# Patient Record
Sex: FEMALE | Race: WHITE
[De-identification: names, ages, dates, MRNs, and addresses within clinical notes are randomized per-mention and may not be internally consistent; named-entity substitution may affect disease eponyms.]

---

## 2018-08-11 ENCOUNTER — HOSPITAL ENCOUNTER (INPATIENT)
Dept: HOSPITAL 31 - C.ER | Age: 38
Discharge: HOME | DRG: 694 | End: 2018-08-11
Attending: INTERNAL MEDICINE | Admitting: INTERNAL MEDICINE
Payer: COMMERCIAL

## 2018-08-11 VITALS
TEMPERATURE: 97.9 F | RESPIRATION RATE: 20 BRPM | OXYGEN SATURATION: 97 % | HEART RATE: 68 BPM | DIASTOLIC BLOOD PRESSURE: 78 MMHG | SYSTOLIC BLOOD PRESSURE: 114 MMHG

## 2018-08-11 DIAGNOSIS — Z87.442: ICD-10-CM

## 2018-08-11 DIAGNOSIS — N13.2: Primary | ICD-10-CM

## 2018-08-11 LAB
ALBUMIN SERPL-MCNC: 4.6 [, G/DL] (ref 3.5–5)
ALBUMIN/GLOB SERPL: 1.5 [,] (ref 1–2.1)
ALT SERPL-CCNC: 23 [, U/L] (ref 9–52)
APTT BLD: 32 [, SECONDS] (ref 21–34)
AST SERPL-CCNC: 22 [, U/L] (ref 14–36)
BASOPHILS # BLD AUTO: 0 [, K/UL] (ref 0–0.2)
BASOPHILS NFR BLD: 0.4 [, %] (ref 0–2)
BILIRUB UR-MCNC: NEGATIVE [,]
BUN SERPL-MCNC: 12 [, MG/DL] (ref 7–17)
CALCIUM SERPL-MCNC: 9.5 [, MG/DL] (ref 8.6–10.4)
EOSINOPHIL # BLD AUTO: 0 [, K/UL] (ref 0–0.7)
EOSINOPHIL NFR BLD: 0.3 [, %] (ref 0–4)
ERYTHROCYTE [DISTWIDTH] IN BLOOD BY AUTOMATED COUNT: 12.3 [, %] (ref 11.5–14.5)
GFR NON-AFRICAN AMERICAN: > 60 [,]
GLUCOSE UR STRIP-MCNC: NORMAL [, MG/DL]
HGB BLD-MCNC: 14.1 [, G/DL] (ref 11–16)
INR PPP: 1.1 [,]
LEUKOCYTE ESTERASE UR-ACNC: (no result) [, LEU/UL]
LIPASE: 33 [, U/L] (ref 23–300)
LYMPHOCYTE: 9 [, %] (ref 20–40)
LYMPHOCYTES # BLD AUTO: 0.9 [, K/UL] (ref 1–4.3)
LYMPHOCYTES NFR BLD AUTO: 8.4 [, %] (ref 20–40)
MCH RBC QN AUTO: 30.7 [, PG] (ref 27–31)
MCHC RBC AUTO-ENTMCNC: 35.3 [, G/DL] (ref 33–37)
MCV RBC AUTO: 87.1 [, FL] (ref 81–99)
MONOCYTE: 1 [, %] (ref 0–10)
MONOCYTES # BLD: 0.3 [, K/UL] (ref 0–0.8)
MONOCYTES NFR BLD: 3 [, %] (ref 0–10)
NEUTROPHILS # BLD: 8.9 [, K/UL] (ref 1.8–7)
NEUTROPHILS NFR BLD AUTO: 87.9 [, %] (ref 50–75)
NEUTROPHILS NFR BLD AUTO: 90 [, %] (ref 50–75)
NRBC BLD AUTO-RTO: 0.1 [, %] (ref 0–2)
PH UR STRIP: 6 [,] (ref 5–8)
PLATELET # BLD EST: NORMAL [,]
PLATELET # BLD: 271 [, K/UL] (ref 130–400)
PMV BLD AUTO: 7.6 [, FL] (ref 7.2–11.7)
PROT UR STRIP-MCNC: (no result) [, MG/DL]
PROTHROMBIN TIME: 12.1 [, SECONDS] (ref 9.7–12.2)
RBC # BLD AUTO: 4.58 [, MIL/UL] (ref 3.8–5.2)
RBC # UR STRIP: (no result) [,]
SP GR UR STRIP: 1.02 [,] (ref 1–1.03)
TOTAL CELLS COUNTED BLD: 100 [,]
UROBILINOGEN UR-MCNC: NORMAL [, MG/DL] (ref 0.2–1)
WBC # BLD AUTO: 10.1 [, K/UL] (ref 4.8–10.8)

## 2018-08-11 PROCEDURE — BT1FZZZ FLUOROSCOPY OF LEFT KIDNEY, URETER AND BLADDER: ICD-10-PCS | Performed by: UROLOGY

## 2018-08-11 PROCEDURE — 0T778DZ DILATION OF LEFT URETER WITH INTRALUMINAL DEVICE, VIA NATURAL OR ARTIFICIAL OPENING ENDOSCOPIC: ICD-10-PCS | Performed by: UROLOGY

## 2018-08-11 RX ADMIN — DEXTROSE AND SODIUM CHLORIDE ONE MLS/HR: 5; 900 INJECTION, SOLUTION INTRAVENOUS at 16:47

## 2018-08-11 RX ADMIN — DEXTROSE AND SODIUM CHLORIDE ONE: 5; 900 INJECTION, SOLUTION INTRAVENOUS at 13:13

## 2018-08-11 NOTE — CP.PCM.HP
History of Present Illness





- History of Present Illness


History of Present Illness: 





COMPREHENSIVE   HISTORY & PHYSICAL EXAM 


Patient is admitted from the emergency room with nausea vomiting and left lower 

quadrant pain.


   


HPI


Yesterday patient had ESWL at [facility].  After going on patient started 

complaining of left lower quadrant pain disorder with nausea and vomiting and 

could not keep any food in the stomach.  There was no definite hematuria or 

fevers or chills.


Patient was evaluated in the ER the CAT scan of the abdomen verbal report 

showed residual stones in the left kidney.





PAST HIST.


History of left kidney stone.


PERSONAL HIST:   Smoking.   N   Alcohol.   N      Allergy N            Travel_-

      .


FAMILY HIST : 


ROS :


Constitutional: Negative for weight change,  Eyes: Negative for redness, 

swelling, itching, discharge, vision changes, blurry vision, double vision, 

glaucoma, cataracts, 


  Ears: Negative for hearing loss, ringing, , tinnitus, vertigo


 Nose: Negative for rhinorrhea, stuffiness, sniffing, itching, postnasal drip, 

discoloration, nasal congestion and epistaxis. 


 Throat: Negative for throat clearing, sore throat, hoarseness, difficulty 

swallowing and difficulty speaking. 


  Respiratory: Negative for cough, , sputum production, chest tightness,  

wheezing, pleuritic chest pain ,daytime somnolence, chronic cough, hemoptysis, 

snoring at night,


 Cardiovascular: Negative for chest pain, palpitations, orthopnea, PND, Edema 

of legs, leg cramps, angina, claudication, , irregular heartbeat,


 Neurology: Negative for irritability, muscle weakness, numbness and tingling, 

seizures, tremors, migraines, slurred speech, syncope, memory loss, mood changes

, recurrent headaches 


Gastrointestinal: Negative for difficulty swallowing, diarrhea, constipation, 

black stools, rectal bleeding,  Genitourinary: Negative for frequent urination, 

hematuria, discharge, incontinence, urinary retention, frequent UTI, Psychiatric

: Negative for depression, anxiety/panic, suicidal tendencies, 


Musculoskeletal: Negative for swollen joints, back pain, , neck pain, morning 

stiffness of joints, . 


 Skin: Negative for rash, ulcers, itching, dry skin and pigmented lesions.


P/E: 


  Constitutional: Appears stated age and in no apparent distress. 


 Head: Normocephalic. 


  Ears: External ear canals patent without inflammation. Tympanic membranes 

intact with normal light reflex and landmark. 


  Eyes: Pupils are central, bilaterally equal, symmetrical and reacts to light 

with normal movements and no icterus or pallor. 


 Nose: External nares are patent. Mucosa is pink  Mouth-Throat: Good general 

appearance and condition. No post-pharyngeal/oropharyngeal erythema and 

tonsillar hypertrophy. Good dental hygiene. 


  Neck-Lymphatic: Neck is supple with normal ROM, no thyromegaly, lymph nodes 

or masses. JVD is normal with no carotid bruit. 


  Lungs: Clear to percussion and auscultation with bilateral normal air entry. 


  Cardiovascular: S1 and S2 are normal with no murmurs, gallops and rub. 


  GI Exam: No hepatomegaly. Abdomen is soft and tender. No Organomegaly , 

masses or hernias are evident and bowel sounds are normal and active. 


  Neurology: Higher function and all cranial nerves intact, with no gross motor 

or sensory deficit. Superficial and deep reflexes are normal with downwards 

planters. No cerebellar deficit with normal gait. 


  Musculoskeletal: No tender spots with normal curvature of the spine with no 

swelling or restricted ROM of the small and large joints. 


  Extremities: Homans sign absent. Intact pulses with no pitting edema, calf 

tenderness or skin color changes. 


  Skin: No rash, eruptions or abnormal skin pigmentation





LAB/RADIOLOGY:


ASSESMENT :


Status post ESWL with GI symptoms with residual stone.  Rule out UTI





PLAN: 


IV fluids and pain management and evaluation with urologist.








Present on Admission





- Present on Admission


Any Indicators Present on Admission: No





Past Patient History





- Past Social History


Smoking Status: Never Smoked





- PSYCHIATRIC


Hx Substance Use: No





- SURGICAL HISTORY


Hx Surgeries: No





Meds


Allergies/Adverse Reactions: 


 Allergies











Allergy/AdvReac Type Severity Reaction Status Date / Time


 


banana Allergy   Verified 08/11/18 02:32


 


nut - unspecified Allergy   Verified 08/11/18 02:32














Results





- Vital Signs


Recent Vital Signs: 





 Last Vital Signs











Temp  98.7 F   08/11/18 09:15


 


Pulse  58 L  08/11/18 09:15


 


Resp  20   08/11/18 09:15


 


BP  129/77   08/11/18 09:15


 


Pulse Ox  98   08/11/18 09:15














- Labs


Result Diagrams: 


 08/11/18 03:08





 08/11/18 03:08


Labs: 





 Laboratory Results - last 24 hr











  08/11/18 08/11/18 08/11/18





  03:08 03:08 03:08


 


WBC  10.1  


 


RBC  4.58  


 


Hgb  14.1  


 


Hct  39.9  


 


MCV  87.1  


 


MCH  30.7  


 


MCHC  35.3  


 


RDW  12.3  


 


Plt Count  271  


 


MPV  7.6  


 


Neut % (Auto)  87.9 H  


 


Lymph % (Auto)  8.4 L  


 


Mono % (Auto)  3.0  


 


Eos % (Auto)  0.3  


 


Baso % (Auto)  0.4  


 


Neut # (Auto)  8.9 H  


 


Lymph # (Auto)  0.9 L  


 


Mono # (Auto)  0.3  


 


Eos # (Auto)  0.0  


 


Baso # (Auto)  0.0  


 


Neutrophils % (Manual)  90 H  


 


Lymphocytes % (Manual)  9 L  


 


Monocytes % (Manual)  1  


 


Platelet Estimate  Normal  


 


PT   


 


INR   


 


APTT   


 


Sodium   142 


 


Potassium   4.2 


 


Chloride   103 


 


Carbon Dioxide   24 


 


Anion Gap   18 


 


BUN   12 


 


Creatinine   0.7 


 


Est GFR ( Amer)   > 60 


 


Est GFR (Non-Af Amer)   > 60 


 


Random Glucose   152 H 


 


Calcium   9.5 


 


Total Bilirubin   0.4 


 


AST   22 


 


ALT   23 


 


Alkaline Phosphatase   98 


 


Total Protein   7.7 


 


Albumin   4.6 


 


Globulin   3.1 


 


Albumin/Globulin Ratio   1.5 


 


Lipase   33 


 


Urine Color    Red


 


Urine Clarity    Turbid


 


Urine pH    6.0


 


Ur Specific Gravity    1.019


 


Urine Protein    2+ H


 


Urine Glucose (UA)    Normal


 


Urine Ketones    Trace


 


Urine Blood    3+ H


 


Urine Nitrate    Negative


 


Urine Bilirubin    Negative


 


Urine Urobilinogen    Normal


 


Ur Leukocyte Esterase    Trace


 


Urine WBC (Auto)    80 H


 


Urine RBC (Auto)    4448 H














  08/11/18





  04:12


 


WBC 


 


RBC 


 


Hgb 


 


Hct 


 


MCV 


 


MCH 


 


MCHC 


 


RDW 


 


Plt Count 


 


MPV 


 


Neut % (Auto) 


 


Lymph % (Auto) 


 


Mono % (Auto) 


 


Eos % (Auto) 


 


Baso % (Auto) 


 


Neut # (Auto) 


 


Lymph # (Auto) 


 


Mono # (Auto) 


 


Eos # (Auto) 


 


Baso # (Auto) 


 


Neutrophils % (Manual) 


 


Lymphocytes % (Manual) 


 


Monocytes % (Manual) 


 


Platelet Estimate 


 


PT  12.1


 


INR  1.1


 


APTT  32


 


Sodium 


 


Potassium 


 


Chloride 


 


Carbon Dioxide 


 


Anion Gap 


 


BUN 


 


Creatinine 


 


Est GFR ( Amer) 


 


Est GFR (Non-Af Amer) 


 


Random Glucose 


 


Calcium 


 


Total Bilirubin 


 


AST 


 


ALT 


 


Alkaline Phosphatase 


 


Total Protein 


 


Albumin 


 


Globulin 


 


Albumin/Globulin Ratio 


 


Lipase 


 


Urine Color 


 


Urine Clarity 


 


Urine pH 


 


Ur Specific Gravity 


 


Urine Protein 


 


Urine Glucose (UA) 


 


Urine Ketones 


 


Urine Blood 


 


Urine Nitrate 


 


Urine Bilirubin 


 


Urine Urobilinogen 


 


Ur Leukocyte Esterase 


 


Urine WBC (Auto) 


 


Urine RBC (Auto)

## 2018-08-11 NOTE — CT
Date of service: 



08/11/2018



PROCEDURE:  CT Abdomen and Pelvis with contrast



HISTORY:

s/p eswl left kidney



COMPARISON:

None.



TECHNIQUE:

Contrast dose: 100 mL Visipaque 320.



Axial and reformatted coronal and sagittal CT images of the abdomen 

and pelvis were obtained after IV contrast administration.



Radiation dose:



Total exam DLP = 651.38 mGy-cm.



This CT exam was performed using one or more of the following dose 

reduction techniques: Automated exposure control, adjustment of the 

mA and/or kV according to patient size, and/or use of iterative 

reconstruction technique.



FINDINGS:



LOWER THORAX:

Unremarkable. 



LIVER:

Unremarkable. No gross lesion or ductal dilatation. 



GALLBLADDER AND BILE DUCTS:

Unremarkable. 



PANCREAS:

Unremarkable. No gross lesion or ductal dilatation.



SPLEEN:

Unremarkable. 



ADRENALS:

Unremarkable. No mass. 



KIDNEYS AND URETERS:

There is mild left hydronephrosis and hydroureter up to the distal 

left ureter.  There are at least 2 calculi at the distal left ureter 

and UV junction with the largest measures approximately 0.8 x 0.3 

centimeter.  There are nonobstructing left renal calculi with the 

largest 7 millimeter no evidence of right hydronephrosis. 



VASCULATURE:

Unremarkable. No aortic aneurysm. 



BOWEL:

Unremarkable. No obstruction. No gross mural thickening. 



APPENDIX:

Normal appendix. 



PERITONEUM:

Unremarkable. No free fluid. No free air. 



LYMPH NODES:

Unremarkable. No enlarged lymph nodes. 



BLADDER:

Unremarkable. 



REPRODUCTIVE:

Unremarkable. 



BONES:

No acute fracture. 



OTHER FINDINGS:

None.



IMPRESSION:

Mild left hydronephrosis and hydroureter up to 2 calculi at the left 

UV junction as described above.



Multiple nonobstructing left renal calculi.



No other acute pathology noted in the abdomen and pelvis.



Preliminary report was submitted by virtual Radiology.

## 2018-08-11 NOTE — PCM.SURG1
Surgeon's Initial Post Op Note





- Surgeon's Notes


Surgeon: CARMINA Layton


Assistant: none


Type of Anesthesia: IV Sedation


Pre-Operative Diagnosis: L renal colic, L ureteral and renal calculi


Operative Findings: same


Post-Operative Diagnosis: same


Operation Performed: cysto, L rtg pyelogram, insertion of L ureteral stent, EUA


Specimen/Specimens Removed: urine


Estimated Blood Loss: EBL {In ML}: 0


Blood Products Given: N/A


Post-Op Condition: Good


Date of Surgery/Procedure: 08/11/18


Time of Surgery/Procedure: 14:30

## 2018-08-11 NOTE — C.PDOC
History Of Present Illness


patient had ESWL done yesterday for left kidney stone. Presents with worsening 

left flank pain , n/v., not tolerating po.  no f/c. sharp., stabbing pain


Time Seen by Provider: 08/11/18 02:44


Chief Complaint (Nursing): Abdominal Pain


History Per: Patient


History/Exam Limitations: no limitations


Onset/Duration Of Symptoms: Hrs


Current Symptoms Are (Timing): Worse


Context: Other


Severity: Severe


Pain Scale Rating Of: 7


Location Of Pain/Discomfort: Other (left flank)


Radiation Of Pain To:: Back


Quality Of Discomfort: Sharp


Associated Symptoms: Nausea.  denies: Fever, Chills


Exacerbating Factors: None


Alleviating Factors: None


Last Bowel Movement: Yesterday


Recent travel outside of the [place]: No


Additional History Per: Family


Abnormal Vaginal Bleeding: No





Past Medical History


Reviewed: Historical Data, Nursing Documentation, Vital Signs


Vital Signs: 


 Last Vital Signs











Temp  98.4 F   08/11/18 02:33


 


Pulse  73   08/11/18 02:33


 


Resp  20   08/11/18 02:33


 


BP      


 


Pulse Ox  98   08/11/18 03:22











Family History: States: No Known Family Hx





- Social History


Hx Alcohol Use: No


Hx Substance Use: No





Review Of Systems


Constitutional: Negative for: Fever, Chills


Cardiovascular: Negative for: Chest Pain


Respiratory: Negative for: Shortness of Breath


Gastrointestinal: Positive for: Nausea, Abdominal Pain (left flank)


Genitourinary: Negative for: Vaginal Bleeding


Musculoskeletal: Positive for: Back Pain


Skin: Negative for: Rash


Neurological: Negative for: Weakness


Psych: Negative for: Anxiety





Physical Exam





- Physical Exam


Appears: Non-toxic


Skin: Warm, Dry


Chest: Symmetrical


Cardiovascular: Rhythm Regular


Respiratory: No Rales, No Rhonchi, No Wheezing


Gastrointestinal/Abdominal: Soft, Tenderness (left flank), No Distention


Back: CVA Tenderness (l)


Extremity: Normal ROM





ED Course And Treatment





- Laboratory Results


Result Diagrams: 


 08/11/18 03:08





 08/11/18 03:08


O2 Sat by Pulse Oximetry: 98





Disposition


Discussed With : Carson Hill


Comment: accepted the pt on his service and took over the care at 6AM


Doctor Will See Patient In The: Hospital


Counseled Patient/Family Regarding: Studies Performed, Diagnosis





- Disposition


Disposition: HOSPITALIZED


Disposition Time: 02:44


Condition: FAIR


Forms:  [facility] (English)





- Clinical Impression


Clinical Impression: 


 Abdominal pain, Nausea, Vomiting, Renal colic on left side, Kidney stone on 

left side








Decision To Admit





- Pt Status Changed To:


Hospital Disposition Of: Inpatient





- Admit Certification


Admit to Inpatient:: After my assessment, the patient will require 

hospitalization for at least two midnights.  This is because of the severity of 

symptoms shown, intensity of services needed, and/or the medical risk in this 

patient being treated as an outpatient.





- InPatient:


Physician Admission Certification:: After my assessment, the patient will 

require hospitalization for at least two midnights.  This is because of the 

severity of symptoms shown, intensity of services needed, and/or the medical 

risk in this patient being treated as an outpatient.





- .


Bed Request Type: Regular


Admitting Physician: Carson Hill


Patient Diagnosis: 


 Abdominal pain, Nausea, Vomiting, Renal colic on left side, Kidney stone on 

left side

## 2018-08-11 NOTE — CP.PCM.CON
Past Patient History





- Past Medical History & Family History


Past Medical History?: No





- Past Social History


Smoking Status: Never Smoked





- MUSCULOSKELETAL/RHEUMATOLOGICAL


Hx Falls: No





- PSYCHIATRIC


Hx Substance Use: No





- SURGICAL HISTORY


Hx Surgeries: No





- ANESTHESIA


Hx Anesthesia: No


Hx Anesthesia Reactions: No


Hx Malignant Hyperthermia: No


Has any member of the family had a problem w/ anesthesia?: No





Meds


Allergies/Adverse Reactions: 


 Allergies











Allergy/AdvReac Type Severity Reaction Status Date / Time


 


banana Allergy   Verified 08/11/18 02:32


 


nut - unspecified Allergy   Verified 08/11/18 02:32














- Medications


Medications: 


 Current Medications





Hydromorphone HCl (Dilaudid)  0.5 mg IVP Q15M PRN


   PRN Reason: Pain, severe (8-10)


   Stop: 08/11/18 15:38


Ceftriaxone Sodium 1 gm/ (Sodium Chloride)  100 mls @ 100 mls/hr IVPB DAILY AMY


   PRN Reason: Protocol


   Last Admin: 08/11/18 09:02 Dose:  100 mls/hr


Dextrose/Sodium Chloride (Dextrose 5%/0.9% Ns 1000 Ml)  1,000 mls @ 120 mls/hr 

IV .Q8H20M ONE


   Stop: 08/11/18 20:38


   Last Admin: 08/11/18 13:13 Dose:  Not Given


Morphine Sulfate (Morphine)  2 mg IVP Q6 PRN


   PRN Reason: Pain, moderate (4-7)


   Last Admin: 08/11/18 08:10 Dose:  2 mg


Ondansetron HCl (Zofran Inj)  2 mg IVP Q6 PRN


   PRN Reason: nausea and vomiting


   Last Admin: 08/11/18 12:53 Dose:  2 mg


Ondansetron HCl (Zofran Inj)  4 mg IVP ONCE PRN


   PRN Reason: Nausea/Vomiting


   Stop: 08/11/18 15:38











Results





- Vital Signs


Recent Vital Signs: 


 Last Vital Signs











Temp  97.2 F L  08/11/18 14:24


 


Pulse  64   08/11/18 15:00


 


Resp  14   08/11/18 15:00


 


BP  110/71   08/11/18 15:00


 


Pulse Ox  99   08/11/18 15:00














- Labs


Result Diagrams: 


 08/11/18 03:08





 08/11/18 03:08


Labs: 


 Laboratory Results - last 24 hr











  08/11/18 08/11/18 08/11/18





  03:08 03:08 03:08


 


WBC  10.1  


 


RBC  4.58  


 


Hgb  14.1  


 


Hct  39.9  


 


MCV  87.1  


 


MCH  30.7  


 


MCHC  35.3  


 


RDW  12.3  


 


Plt Count  271  


 


MPV  7.6  


 


Neut % (Auto)  87.9 H  


 


Lymph % (Auto)  8.4 L  


 


Mono % (Auto)  3.0  


 


Eos % (Auto)  0.3  


 


Baso % (Auto)  0.4  


 


Neut # (Auto)  8.9 H  


 


Lymph # (Auto)  0.9 L  


 


Mono # (Auto)  0.3  


 


Eos # (Auto)  0.0  


 


Baso # (Auto)  0.0  


 


Neutrophils % (Manual)  90 H  


 


Lymphocytes % (Manual)  9 L  


 


Monocytes % (Manual)  1  


 


Platelet Estimate  Normal  


 


PT   


 


INR   


 


APTT   


 


Sodium   142 


 


Potassium   4.2 


 


Chloride   103 


 


Carbon Dioxide   24 


 


Anion Gap   18 


 


BUN   12 


 


Creatinine   0.7 


 


Est GFR ( Amer)   > 60 


 


Est GFR (Non-Af Amer)   > 60 


 


Random Glucose   152 H 


 


Calcium   9.5 


 


Total Bilirubin   0.4 


 


AST   22 


 


ALT   23 


 


Alkaline Phosphatase   98 


 


Total Protein   7.7 


 


Albumin   4.6 


 


Globulin   3.1 


 


Albumin/Globulin Ratio   1.5 


 


Lipase   33 


 


Urine Color    Red


 


Urine Clarity    Turbid


 


Urine pH    6.0


 


Ur Specific Gravity    1.019


 


Urine Protein    2+ H


 


Urine Glucose (UA)    Normal


 


Urine Ketones    Trace


 


Urine Blood    3+ H


 


Urine Nitrate    Negative


 


Urine Bilirubin    Negative


 


Urine Urobilinogen    Normal


 


Ur Leukocyte Esterase    Trace


 


Urine WBC (Auto)    80 H


 


Urine RBC (Auto)    4448 H














  08/11/18





  04:12


 


WBC 


 


RBC 


 


Hgb 


 


Hct 


 


MCV 


 


MCH 


 


MCHC 


 


RDW 


 


Plt Count 


 


MPV 


 


Neut % (Auto) 


 


Lymph % (Auto) 


 


Mono % (Auto) 


 


Eos % (Auto) 


 


Baso % (Auto) 


 


Neut # (Auto) 


 


Lymph # (Auto) 


 


Mono # (Auto) 


 


Eos # (Auto) 


 


Baso # (Auto) 


 


Neutrophils % (Manual) 


 


Lymphocytes % (Manual) 


 


Monocytes % (Manual) 


 


Platelet Estimate 


 


PT  12.1


 


INR  1.1


 


APTT  32


 


Sodium 


 


Potassium 


 


Chloride 


 


Carbon Dioxide 


 


Anion Gap 


 


BUN 


 


Creatinine 


 


Est GFR ( Amer) 


 


Est GFR (Non-Af Amer) 


 


Random Glucose 


 


Calcium 


 


Total Bilirubin 


 


AST 


 


ALT 


 


Alkaline Phosphatase 


 


Total Protein 


 


Albumin 


 


Globulin 


 


Albumin/Globulin Ratio 


 


Lipase 


 


Urine Color 


 


Urine Clarity 


 


Urine pH 


 


Ur Specific Gravity 


 


Urine Protein 


 


Urine Glucose (UA) 


 


Urine Ketones 


 


Urine Blood 


 


Urine Nitrate 


 


Urine Bilirubin 


 


Urine Urobilinogen 


 


Ur Leukocyte Esterase 


 


Urine WBC (Auto) 


 


Urine RBC (Auto) 














Assessment & Plan





- Assessment and Plan (Free Text)


Assessment: 





IMP:


L renal colic, p L reanl ESWL





full note t/f





- Date & Time


Date: 08/11/18


Time: 12:55

## 2018-08-12 NOTE — CP.PCM.DIS
Provider





- Provider


Date of Admission: 


08/11/18 05:57





Attending physician: 


Carson Hill MD





Time Spent in preparation of Discharge (in minutes): 30





Hospital Course





- Lab Results


Lab Results: 


 Micro Results





08/11/18 09:45   Blood   Blood Culture - Preliminary


                            NO GROWTH AFTER 24 HOURS


08/11/18 09:45   Blood   Blood Culture - Preliminary


                            NO GROWTH AFTER 24 HOURS





 Most Recent Lab Values











WBC  10.1 K/uL (4.8-10.8)   08/11/18  03:08    


 


RBC  4.58 Mil/uL (3.80-5.20)   08/11/18  03:08    


 


Hgb  14.1 g/dL (11.0-16.0)   08/11/18  03:08    


 


Hct  39.9 % (34.0-47.0)   08/11/18  03:08    


 


MCV  87.1 fL (81.0-99.0)   08/11/18  03:08    


 


MCH  30.7 pg (27.0-31.0)   08/11/18  03:08    


 


MCHC  35.3 g/dL (33.0-37.0)   08/11/18  03:08    


 


RDW  12.3 % (11.5-14.5)   08/11/18  03:08    


 


Plt Count  271 K/uL (130-400)   08/11/18  03:08    


 


MPV  7.6 fL (7.2-11.7)   08/11/18  03:08    


 


Neut % (Auto)  87.9 % (50.0-75.0)  H  08/11/18  03:08    


 


Lymph % (Auto)  8.4 % (20.0-40.0)  L  08/11/18  03:08    


 


Mono % (Auto)  3.0 % (0.0-10.0)   08/11/18  03:08    


 


Eos % (Auto)  0.3 % (0.0-4.0)   08/11/18  03:08    


 


Baso % (Auto)  0.4 % (0.0-2.0)   08/11/18  03:08    


 


Neut # (Auto)  8.9 K/uL (1.8-7.0)  H  08/11/18  03:08    


 


Lymph # (Auto)  0.9 K/uL (1.0-4.3)  L  08/11/18  03:08    


 


Mono # (Auto)  0.3 K/uL (0.0-0.8)   08/11/18  03:08    


 


Eos # (Auto)  0.0 K/uL (0.0-0.7)   08/11/18  03:08    


 


Baso # (Auto)  0.0 K/uL (0.0-0.2)   08/11/18  03:08    


 


Neutrophils % (Manual)  90 % (50-75)  H  08/11/18  03:08    


 


Lymphocytes % (Manual)  9 % (20-40)  L  08/11/18  03:08    


 


Monocytes % (Manual)  1 % (0-10)   08/11/18  03:08    


 


Platelet Estimate  Normal  (NORMAL)   08/11/18  03:08    


 


PT  12.1 SECONDS (9.7-12.2)   08/11/18  04:12    


 


INR  1.1   08/11/18  04:12    


 


APTT  32 SECONDS (21-34)   08/11/18  04:12    


 


Sodium  142 mmol/L (132-148)   08/11/18  03:08    


 


Potassium  4.2 mmol/L (3.6-5.2)   08/11/18  03:08    


 


Chloride  103 mmol/L ()   08/11/18  03:08    


 


Carbon Dioxide  24 mmol/L (22-30)   08/11/18  03:08    


 


Anion Gap  18  (10-20)   08/11/18  03:08    


 


BUN  12 mg/dL (7-17)   08/11/18  03:08    


 


Creatinine  0.7 mg/dL (0.7-1.2)   08/11/18  03:08    


 


Est GFR ( Amer)  > 60   08/11/18  03:08    


 


Est GFR (Non-Af Amer)  > 60   08/11/18  03:08    


 


Random Glucose  152 mg/dL ()  H  08/11/18  03:08    


 


Calcium  9.5 mg/dl (8.6-10.4)   08/11/18  03:08    


 


Total Bilirubin  0.4 mg/dL (0.2-1.3)   08/11/18  03:08    


 


AST  22 U/L (14-36)   08/11/18  03:08    


 


ALT  23 U/L (9-52)   08/11/18  03:08    


 


Alkaline Phosphatase  98 U/L ()   08/11/18  03:08    


 


Total Protein  7.7 g/dL (6.3-8.3)   08/11/18  03:08    


 


Albumin  4.6 g/dL (3.5-5.0)   08/11/18  03:08    


 


Globulin  3.1 gm/dL (2.2-3.9)   08/11/18  03:08    


 


Albumin/Globulin Ratio  1.5  (1.0-2.1)   08/11/18  03:08    


 


Lipase  33 U/L ()   08/11/18  03:08    


 


Urine Color  Red  (YELLOW)   08/11/18  03:08    


 


Urine Clarity  Turbid  (Clear)   08/11/18  03:08    


 


Urine pH  6.0  (5.0-8.0)   08/11/18  03:08    


 


Ur Specific Gravity  1.019  (1.003-1.030)   08/11/18  03:08    


 


Urine Protein  2+ mg/dL (NEGATIVE)  H  08/11/18  03:08    


 


Urine Glucose (UA)  Normal mg/dL (Normal)   08/11/18  03:08    


 


Urine Ketones  Trace mg/dL (NEGATIVE)   08/11/18  03:08    


 


Urine Blood  3+  (NEGATIVE)  H  08/11/18  03:08    


 


Urine Nitrate  Negative  (NEGATIVE)   08/11/18  03:08    


 


Urine Bilirubin  Negative  (NEGATIVE)   08/11/18  03:08    


 


Urine Urobilinogen  Normal mg/dL (0.2-1.0)   08/11/18  03:08    


 


Ur Leukocyte Esterase  Trace Chiqui/uL (Negative)   08/11/18  03:08    


 


Urine WBC (Auto)  80 /hpf (0-5)  H  08/11/18  03:08    


 


Urine RBC (Auto)  4448 /hpf (0-3)  H  08/11/18  03:08    














- Hospital Course


Hospital Course: 





Patient is admitted from the emergency room with nausea vomiting and left lower 

quadrant pain.


   


HPI


Yesterday patient had ESWL at [facility].  After going on patient started 

complaining of left lower quadrant pain disorder with nausea and vomiting and 

could not keep any food in the stomach.  There was no definite hematuria or 

fevers or chills.


Patient was evaluated in the ER the CAT scan of the abdomen verbal report 

showed residual stones in the left kidney








The patient had cystoscopy with left ureteral stent post procedure patient was 

discharged and cleared by  urine cultures are still pending patient was given 

prescriptions by the urologist and will be followed by urologist in the office.





Discharge Plan





- Follow Up Plan


Condition: FAIR


Disposition: HOME/ ROUTINE


Instructions:  Ureteral Stent, Ureteral Stent (DC)


Additional Instructions: 


Dr. Carty reviewed followed up instructions and discharge with patient and 

family.  Verbalized understanding.

## 2018-08-12 NOTE — RAD
Date of service: 



08/11/2018



PROCEDURE:  Fluoroscopic guidance



HISTORY:

RENAL COLIC



COMPARISON:

Prior x-ray of the abdomen



TECHNIQUE:

Fluoroscopic guidance was provided Ing in the OR during retrograde 

pyelogram and insertion of ureteral stent



FINDINGS:

Fluoroscopic guidance was provided during retrograde pyelogram. No 

evidence of significant left hydronephrosis.  Left ureteral stent was 

inserted and seen at appropriate position.



IMPRESSION:

Fluoroscopic guidance as described above

## 2018-08-12 NOTE — RAD
Date of service: 



08/11/2018



HISTORY:

RENAL COLIC  



COMPARISON:

Comparison is made with the previous CT dated 08/11/2018



FINDINGS:



BOWEL:

Normal. No obstruction. No free air. 



BONES:

Normal.



OTHER FINDINGS:

There are left renal calculi noted. No definite evidence of right 

renal calculi.



IMPRESSION:

Left renal calculi.

## 2018-08-13 NOTE — CON
Copied To:  Zulma Layton MD

Attending MD:  Zulma Layton MD



DATE:  08/11/2018



UROLOGY CONSULTATION



REQUESTED BY:  Dr. Hill and  _____.



Urology consultation is filled by Dr. Zulma Layton.



REASON FOR CONSULTATION:  Left renal colic.



HISTORY OF PRESENT ILLNESS:  The patient is a 37-year-old female with left

flank pain.



The patient has had pain since early this morning/late last night.  The

patient developed left flank pain.  She has associated severe nausea and

vomiting.  She persisted with the pain despite her analgesic therapy.  In

fact, she may have vomited because of the Percocet.  She certainly did

vomit the Percocet.



The patient presented to the emergency room.  She required first morphine

and then Toradol for relief of pain.



The patient reports that the pain is less, although she is still having

pain.



Mrs. Scott is in otherwise good health.  She has history of a left renal

stone.  She has been symptomatic with left flank pain due to left renal

stone.



On 08/10/2018, the patient underwent extracorporeal shockwave lithotripsy,

of her left renal stone.  There was excellent fragmentation.



Thereafter, the patient passed few stone fragments.  However, now she is

having left flank pain.



The patient is otherwise well.  No history of hypertension or diabetes.  No

history of pneumonia, asthma, or tuberculosis.



The patient reports no hematuria.



The patient has history of labor and delivery x2.



The patient lives with her .



The patient is employed.  The patient does not smoke.



PHYSICAL EXAMINATION:

GENERAL:  The patient is a well-developed, well-nourished middle-aged

female, appearing stated age.

ABDOMEN:  Soft, nondistended.  There was moderate left flank tenderness.



LABORATORY DATA:  Reviewed.  Chemistry and CBC okay.  CAT scan reveals left

hydronephrosis.  There are stone fragments within the left kidney as well

as within the left ureter, especially in the left distal ureter.



IMPRESSION:  Left renal colic due to ureteral stone fragments. 

Steinstrasse.



Findings discussed with the patient and her family.



Plan for cystoscopy and stent insertion in view of the persistent pain.



The patient had been on Flomax as well.



Further therapy to follow according to the patient's clinical course as

well as results of above.





__________________________________________

[place] MD [place]





DD:  08/12/2018 21:35:52

DT:  08/12/2018 21:37:59

[facility] # [id number]

## 2018-08-13 NOTE — OP
Copied To:  Zulma Layton MD

Attending MD:  Zulma Layton MD



PROCEDURE DATE:  08/11/2018



PREOPERATIVE DIAGNOSES:  Left renal colic.  Left ureteral calculi.  Left

renal calculi.  (_____).



PROCEDURES:  Cystoscopy.  Insertion of left ureteral stent.



OPERATING SURGEON:  Zulma Layton MD



PROCEDURE AS FOLLOWS:  The patient received perioperative antibiotics.  The

patient was placed in lithotomy position.  Genitalia were prepped and

draped sterilely.  Sedation was provided by the anesthesiologist.



The procedure was performed under video endoscopic control as well as under

fluoroscopic control.



 fluoroscopy and abdominal x-ray were performed.  There were noted to

be stone fragments in the course of the distal left ureter.



There were also stones overlying the left renal shadow.  These were less

well visualized.



A 22-Polish cystoscope sheath was introduced with obturator.  The urethra

and bladder were inspected.



There was no stone within the bladder.  There was no tumor within the

bladder.



A 0.035-inch guidewire was inserted into the left ureteral orifice.  There

was a brisk hydronephrotic drip around the guidewire.  There were multiple

stone fragments that exited from the ureteral orifice.  The stone fragments

were subsequently irrigated free and sent for stone analysis.



A guidewire was set up to the level of the kidney.  An open-ended catheter

was inserted over the guidewire.  Retrograde pyelogram demonstrated

moderate hydronephrosis.



The guidewire was reinserted.  A 6-Polish multi-length stent was inserted

over the guidewire.  Proper stent position was confirmed with fluoroscopy

and endoscopy.  The guidewire was removed.  Stent was left in place.



Cystoscopy confirmed the above findings again.



The bladder was then drained.  Cystoscope sheath was removed.



The patient was returned to supine position.



The patient tolerated the procedure without complication.





__________________________________________

Zulma Layton MD





DD:  08/12/2018 21:38:47

DT:  08/12/2018 21:40:41

[facility] # [id number]